# Patient Record
Sex: FEMALE | URBAN - METROPOLITAN AREA
[De-identification: names, ages, dates, MRNs, and addresses within clinical notes are randomized per-mention and may not be internally consistent; named-entity substitution may affect disease eponyms.]

---

## 2019-12-23 ENCOUNTER — OFFICE VISIT (OUTPATIENT)
Dept: URBAN - METROPOLITAN AREA CLINIC 11 | Facility: CLINIC | Age: 34
End: 2019-12-23
Payer: COMMERCIAL

## 2019-12-23 DIAGNOSIS — H44.21 DEGENERATIVE MYOPIA, RIGHT EYE: Primary | ICD-10-CM

## 2019-12-23 DIAGNOSIS — H33.022 RETINAL DETACHMENT WITH MULTIPLE BREAKS, LEFT EYE: ICD-10-CM

## 2019-12-23 PROCEDURE — 99214 OFFICE O/P EST MOD 30 MIN: CPT | Performed by: OPHTHALMOLOGY

## 2019-12-23 PROCEDURE — 92134 CPTRZ OPH DX IMG PST SGM RTA: CPT | Performed by: OPHTHALMOLOGY

## 2019-12-23 PROCEDURE — 99204 OFFICE O/P NEW MOD 45 MIN: CPT | Performed by: OPHTHALMOLOGY

## 2019-12-23 RX ORDER — OFLOXACIN 3 MG/ML
0.3 % SOLUTION/ DROPS OPHTHALMIC
Qty: 1 | Refills: 0 | Status: INACTIVE
Start: 2019-12-23 | End: 2020-02-13

## 2019-12-23 RX ORDER — PREDNISOLONE ACETATE 10 MG/ML
1 % SUSPENSION/ DROPS OPHTHALMIC
Qty: 1 | Refills: 2 | Status: INACTIVE
Start: 2019-12-23 | End: 2020-02-13

## 2019-12-23 ASSESSMENT — INTRAOCULAR PRESSURE
OS: 5
OD: 13

## 2019-12-23 NOTE — IMPRESSION/PLAN
Impression: Retinal detachment with multiple breaks, left eye: H33.022. OS. Plan: OCT ordered and performed today. Discussed diagnosis with patient. The clinical exam is consistent retinal detachment. To reduce the risk of further vision loss, urgent surgical intervention is strongly recommended. Discussed treatment options, the various techniques to repair an RD discussed with patient including scleral buckle, silicone oil and pneumatic retinopexy. In addition altitude with gas bubble were discussed. Recommend sx, after a through discussion of surgical R/B/A. The patient understands the potential risks of sx, including (but not limited to) bleeding, pain, infection, loss of vision, loss of eye and possible need for more sx. The patient also understands that pre detachment visual acuity may not return.  The patient elects to proceed with sx RL-2

## 2019-12-23 NOTE — IMPRESSION/PLAN
Impression: Degenerative myopia, right eye: H44.21 OD. Plan: Discussed diagnosis in detail with patient. No treatment is required at this time. Advised patient of condition. Will continue to observe condition and or symptoms.

## 2020-01-02 ENCOUNTER — SURGERY (OUTPATIENT)
Dept: URBAN - METROPOLITAN AREA SURGERY 15 | Facility: SURGERY | Age: 35
End: 2020-01-02
Payer: COMMERCIAL

## 2020-01-02 PROCEDURE — 67107 REPAIR DETACHED RETINA: CPT | Performed by: OPHTHALMOLOGY

## 2020-01-02 RX ORDER — PROCHLORPERAZINE 25 MG/1
25 MG SUPPOSITORY RECTAL
Qty: 2 | Refills: 0 | Status: INACTIVE
Start: 2020-01-02 | End: 2020-01-03

## 2020-01-03 ENCOUNTER — POST-OPERATIVE VISIT (OUTPATIENT)
Dept: URBAN - METROPOLITAN AREA CLINIC 11 | Facility: CLINIC | Age: 35
End: 2020-01-03

## 2020-01-03 PROCEDURE — 99024 POSTOP FOLLOW-UP VISIT: CPT | Performed by: OPTOMETRIST

## 2020-01-03 ASSESSMENT — INTRAOCULAR PRESSURE
OS: 18
OD: 15

## 2020-01-09 ENCOUNTER — POST-OPERATIVE VISIT (OUTPATIENT)
Dept: URBAN - METROPOLITAN AREA CLINIC 11 | Facility: CLINIC | Age: 35
End: 2020-01-09

## 2020-01-09 PROCEDURE — 99024 POSTOP FOLLOW-UP VISIT: CPT | Performed by: OPHTHALMOLOGY

## 2020-01-09 ASSESSMENT — INTRAOCULAR PRESSURE
OS: 18
OD: 16

## 2020-02-13 ENCOUNTER — POST-OPERATIVE VISIT (OUTPATIENT)
Dept: URBAN - METROPOLITAN AREA CLINIC 11 | Facility: CLINIC | Age: 35
End: 2020-02-13

## 2020-02-13 DIAGNOSIS — Z09 ENCNTR FOR F/U EXAM AFT TRTMT FOR COND OTH THAN MALIG NEOPLM: Primary | ICD-10-CM

## 2020-02-13 PROCEDURE — 99024 POSTOP FOLLOW-UP VISIT: CPT | Performed by: OPHTHALMOLOGY

## 2020-02-13 ASSESSMENT — INTRAOCULAR PRESSURE
OD: 15
OS: 9